# Patient Record
Sex: FEMALE | Race: WHITE
[De-identification: names, ages, dates, MRNs, and addresses within clinical notes are randomized per-mention and may not be internally consistent; named-entity substitution may affect disease eponyms.]

---

## 2019-02-08 ENCOUNTER — HOSPITAL ENCOUNTER (OUTPATIENT)
Dept: HOSPITAL 88 - NM | Age: 79
End: 2019-02-08
Attending: INTERNAL MEDICINE
Payer: COMMERCIAL

## 2019-02-08 DIAGNOSIS — R07.2: Primary | ICD-10-CM

## 2019-02-08 PROCEDURE — 93017 CV STRESS TEST TRACING ONLY: CPT

## 2019-02-08 PROCEDURE — 78452 HT MUSCLE IMAGE SPECT MULT: CPT

## 2019-02-08 NOTE — CARDIOLOGY REPORT
DATE OF STUDY:    



LEXISCAN NUCLEAR STRESS TEST



TECHNIQUE:  The patient was given 11 millicuries of Myoview.  Resting 

images were obtained in the horizontal long axis, vertical long axis and 

short axis.  Patient was then hooked up to the EKG machine.  Lexiscan was 

infused over 15 seconds during Lexiscan.  Immediately after Lexiscan 

infusion, the patient was given 32 millicuries of Myoview.  Stress images 

were obtained in the horizontal long axis, vertical long axis and short 

axis.



RESULTS

1.  The resting EKG demonstrated normal sinus rhythm with some nonspecific 

S/T and T-wave changes.  There were no EKG changes and no symptoms during 

Lexiscan infusion.

2.  There was normal perfusion to all segments of the myocardium on the 

resting images.

3.  There was a small area of diminished perfusion in the anterior septum 

on the stress images.

4.  There was normal left ventricular size and function with an ejection 

fraction of 56%.



CONCLUSION:  The patient has a small reversible defect in the anterior 

septum concerning for myocardial ischemia.  









DD:  02/08/2019 15:42

DT:  02/08/2019 15:53

Job#:  [id number]